# Patient Record
Sex: MALE | NOT HISPANIC OR LATINO | URBAN - METROPOLITAN AREA
[De-identification: names, ages, dates, MRNs, and addresses within clinical notes are randomized per-mention and may not be internally consistent; named-entity substitution may affect disease eponyms.]

---

## 2022-07-21 ENCOUNTER — OFFICE VISIT (OUTPATIENT)
Dept: URBAN - METROPOLITAN AREA CLINIC 19 | Facility: CLINIC | Age: 77
End: 2022-07-21

## 2022-07-28 ENCOUNTER — OFFICE VISIT (OUTPATIENT)
Dept: URBAN - METROPOLITAN AREA CLINIC 19 | Facility: CLINIC | Age: 77
End: 2022-07-28

## 2023-08-03 ENCOUNTER — WEB ENCOUNTER (OUTPATIENT)
Dept: URBAN - METROPOLITAN AREA CLINIC 19 | Facility: CLINIC | Age: 78
End: 2023-08-03

## 2023-08-03 ENCOUNTER — LAB OUTSIDE AN ENCOUNTER (OUTPATIENT)
Dept: URBAN - METROPOLITAN AREA CLINIC 19 | Facility: CLINIC | Age: 78
End: 2023-08-03

## 2023-08-03 ENCOUNTER — OFFICE VISIT (OUTPATIENT)
Dept: URBAN - METROPOLITAN AREA CLINIC 19 | Facility: CLINIC | Age: 78
End: 2023-08-03
Payer: MEDICARE

## 2023-08-03 VITALS
HEART RATE: 68 BPM | TEMPERATURE: 98.1 F | WEIGHT: 224.6 LBS | HEIGHT: 75 IN | BODY MASS INDEX: 27.93 KG/M2 | SYSTOLIC BLOOD PRESSURE: 118 MMHG | DIASTOLIC BLOOD PRESSURE: 78 MMHG | OXYGEN SATURATION: 95 %

## 2023-08-03 DIAGNOSIS — Z86.010 PERSONAL HISTORY OF COLONIC POLYPS: ICD-10-CM

## 2023-08-03 DIAGNOSIS — R14.0 ABDOMINAL BLOATING: ICD-10-CM

## 2023-08-03 PROBLEM — 428283002: Status: ACTIVE | Noted: 2023-08-03

## 2023-08-03 PROCEDURE — 99204 OFFICE O/P NEW MOD 45 MIN: CPT | Performed by: NURSE PRACTITIONER

## 2023-08-03 NOTE — HPI-TODAY'S VISIT:
Mr. Landa is a 79 yo male who presents for c/o gas/bloating intermittently. Worse over the past year. Wife states he lives off of Gas-X which he takes a few times/day, keeps it at the bedside. Denies diarrhea or constipation. Has a solid normal BM everyday.  No bloody or black stools. No significant abdominal pain. Appetite decreased somewhat, has lost about 10 pounds in a month. No known family history of colon cancer or colon polyps. No upper GI concerns.   Last colonoscopy we have on file was done by Dr. Alok Soto on 4/7/2010.  A large sessile polyp was found in the rectum-mixed hyperplastic/adenomatous polyp.  Otherwise normal 1 year follow-up was given.  He thinks he may have had another colonoscopy done with Dr. Farrell at [facility] and we are trying to locate record of this.

## 2023-08-24 ENCOUNTER — CLAIMS CREATED FROM THE CLAIM WINDOW (OUTPATIENT)
Dept: URBAN - METROPOLITAN AREA CLINIC 4 | Facility: CLINIC | Age: 78
End: 2023-08-24
Payer: MEDICARE

## 2023-08-24 ENCOUNTER — OFFICE VISIT (OUTPATIENT)
Dept: URBAN - METROPOLITAN AREA SURGERY CENTER 31 | Facility: SURGERY CENTER | Age: 78
End: 2023-08-24
Payer: MEDICARE

## 2023-08-24 DIAGNOSIS — Z86.010 ADENOMAS PERSONAL HISTORY OF COLONIC POLYPS: ICD-10-CM

## 2023-08-24 DIAGNOSIS — D12.4 BENIGN NEOPLASM OF DESCENDING COLON: ICD-10-CM

## 2023-08-24 DIAGNOSIS — D12.4 ADENOMA OF DESCENDING COLON: ICD-10-CM

## 2023-08-24 PROCEDURE — 88305 TISSUE EXAM BY PATHOLOGIST: CPT | Performed by: PATHOLOGY

## 2023-08-24 PROCEDURE — 45380 COLONOSCOPY AND BIOPSY: CPT | Performed by: STUDENT IN AN ORGANIZED HEALTH CARE EDUCATION/TRAINING PROGRAM

## 2023-08-24 PROCEDURE — G8907 PT DOC NO EVENTS ON DISCHARG: HCPCS | Performed by: STUDENT IN AN ORGANIZED HEALTH CARE EDUCATION/TRAINING PROGRAM

## 2024-04-18 ENCOUNTER — OV EP (OUTPATIENT)
Dept: URBAN - METROPOLITAN AREA CLINIC 19 | Facility: CLINIC | Age: 79
End: 2024-04-18
Payer: COMMERCIAL

## 2024-04-18 VITALS
WEIGHT: 206 LBS | HEIGHT: 75 IN | SYSTOLIC BLOOD PRESSURE: 106 MMHG | OXYGEN SATURATION: 96 % | HEART RATE: 51 BPM | BODY MASS INDEX: 25.61 KG/M2 | TEMPERATURE: 97.9 F | DIASTOLIC BLOOD PRESSURE: 50 MMHG

## 2024-04-18 DIAGNOSIS — R14.1 GAS PAIN: ICD-10-CM

## 2024-04-18 DIAGNOSIS — R93.89 ABNORMAL CT SCAN: ICD-10-CM

## 2024-04-18 DIAGNOSIS — R63.0 POOR APPETITE: ICD-10-CM

## 2024-04-18 DIAGNOSIS — R10.84 GENERALIZED ABDOMINAL PAIN: ICD-10-CM

## 2024-04-18 PROBLEM — 129679001: Status: ACTIVE | Noted: 2024-04-18

## 2024-04-18 PROBLEM — 64379006: Status: ACTIVE | Noted: 2024-04-18

## 2024-04-18 PROBLEM — 45979003: Status: ACTIVE | Noted: 2024-04-18

## 2024-04-18 PROBLEM — 102614006: Status: ACTIVE | Noted: 2024-04-18

## 2024-04-18 PROCEDURE — 99204 OFFICE O/P NEW MOD 45 MIN: CPT | Performed by: STUDENT IN AN ORGANIZED HEALTH CARE EDUCATION/TRAINING PROGRAM

## 2024-04-18 RX ORDER — SUCRALFATE 1 G/1
1 TABLET ON AN EMPTY STOMACH TABLET ORAL TWICE A DAY
Qty: 60 | OUTPATIENT
Start: 2024-04-18 | End: 2024-05-18

## 2024-04-18 RX ORDER — LOSARTAN POTASSIUM 25 MG/1
1 TABLET TABLET, FILM COATED ORAL ONCE A DAY
Status: ACTIVE | COMMUNITY

## 2024-04-18 RX ORDER — HYOSCYAMINE SULFATE 0.12 MG/1
1 TABLET AS NEEDED TABLET ORAL
Qty: 45 TABLET | Refills: 5 | OUTPATIENT
Start: 2024-04-18 | End: 2024-10-15

## 2024-04-18 RX ORDER — FUROSEMIDE 20 MG/1
1 TABLET TABLET ORAL ONCE A DAY
Status: ACTIVE | COMMUNITY

## 2024-04-18 RX ORDER — PANTOPRAZOLE SODIUM 40 MG/1
1 TABLET TABLET, DELAYED RELEASE ORAL ONCE A DAY
Qty: 30 | OUTPATIENT
Start: 2024-04-18

## 2024-04-18 NOTE — HPI-TODAY'S VISIT:
4/18/2024: Christine: The patient is a 78-year-old with gas bloat.  Poor appetite with weight loss.  Was scheduled for colonoscopy when seen last.  Has had a 30 lb wt loss with poor appetite.  +Abdominal pain.  Epigastric region.  NOn-radiating.  No nausea.  no melena.  + Sprite or soda intake.  ==============  8/24/2023: Colonoscopy: Excellent prep.  Multiple small polyps, 1 of which was tubular adenoma.  Diverticulosis.  Otherwise normal exam.  No routine recall recommended due to age and risk versus benefit.  8/3/2023: SAJI Lennon: Mr. Landa is a 77 yo male who presents for c/o gas/bloating intermittently. Worse over the past year. Wife states he lives off of Gas-X which he takes a few times/day, keeps it at the bedside. Denies diarrhea or constipation. Has a solid normal BM everyday.  No bloody or black stools. No significant abdominal pain. Appetite decreased somewhat, has lost about 10 pounds in a month. No known family history of colon cancer or colon polyps. No upper GI concerns.   Last colonoscopy we have on file was done by Dr. Alok Soto on 4/7/2010.  A large sessile polyp was found in the rectum-mixed hyperplastic/adenomatous polyp.  Otherwise normal 1 year follow-up was given.  He thinks he may have had another colonoscopy done with Dr. Farrell at [facility] and we are trying to locate record of this.  Plan: Colonoscopy.  FODMAP diet.

## 2024-04-22 ENCOUNTER — LAB (OUTPATIENT)
Dept: URBAN - METROPOLITAN AREA CLINIC 19 | Facility: CLINIC | Age: 79
End: 2024-04-22

## 2024-04-22 PROBLEM — 448765001: Status: ACTIVE | Noted: 2024-04-22

## 2024-04-22 LAB
A/G RATIO: 1.5
ABSOLUTE BASOPHILS: 31
ABSOLUTE EOSINOPHILS: 422
ABSOLUTE LYMPHOCYTES: 1252
ABSOLUTE MONOCYTES: 670
ABSOLUTE NEUTROPHILS: 3825
ALBUMIN: 3.4
ALKALINE PHOSPHATASE: 51
ALT (SGPT): 10
AST (SGOT): 11
BASOPHILS: 0.5
BILIRUBIN, TOTAL: 0.5
BUN/CREATININE RATIO: (no result)
BUN: 15
CA 19-9: 6
CALCIUM: 8.4
CARBON DIOXIDE, TOTAL: 23
CHLORIDE: 105
CREATININE: 0.88
EGFR: 87
EOSINOPHILS: 6.8
GLOBULIN, TOTAL: 2.2
GLUCOSE: 77
HEMATOCRIT: 34.8
HEMOGLOBIN: 11.5
LIPASE: 40
LYMPHOCYTES: 20.2
MCH: 31.6
MCHC: 33
MCV: 95.6
MONOCYTES: 10.8
MPV: 9.8
NEUTROPHILS: 61.7
PLATELET COUNT: 257
POTASSIUM: 3.9
PROTEIN, TOTAL: 5.6
RDW: 12.3
RED BLOOD CELL COUNT: 3.64
SODIUM: 138
WHITE BLOOD CELL COUNT: 6.2

## 2024-04-26 ENCOUNTER — LAB (OUTPATIENT)
Dept: URBAN - METROPOLITAN AREA CLINIC 4 | Facility: CLINIC | Age: 79
End: 2024-04-26
Payer: COMMERCIAL

## 2024-04-26 ENCOUNTER — EGD (OUTPATIENT)
Dept: URBAN - METROPOLITAN AREA SURGERY CENTER 31 | Facility: SURGERY CENTER | Age: 79
End: 2024-04-26

## 2024-04-26 DIAGNOSIS — K29.81 DUODENITIS WITH BLEEDING: ICD-10-CM

## 2024-04-26 DIAGNOSIS — K31.89 OTHER DISEASES OF STOMACH AND DUODENUM: ICD-10-CM

## 2024-04-26 DIAGNOSIS — K13.21 LEUKOPLAKIA OF ORAL MUCOSA, INCLUDING TONGUE: ICD-10-CM

## 2024-04-26 PROCEDURE — 88312 SPECIAL STAINS GROUP 1: CPT | Performed by: PATHOLOGY

## 2024-04-26 PROCEDURE — 88305 TISSUE EXAM BY PATHOLOGIST: CPT | Performed by: PATHOLOGY

## 2024-04-26 RX ORDER — SUCRALFATE 1 G/1
1 TABLET ON AN EMPTY STOMACH TABLET ORAL TWICE A DAY
Qty: 60 | Status: ACTIVE | COMMUNITY
Start: 2024-04-18 | End: 2024-05-18

## 2024-04-26 RX ORDER — PANTOPRAZOLE SODIUM 40 MG/1
1 TABLET TABLET, DELAYED RELEASE ORAL ONCE A DAY
Qty: 30 | Status: ACTIVE | COMMUNITY
Start: 2024-04-18

## 2024-04-26 RX ORDER — FUROSEMIDE 20 MG/1
1 TABLET TABLET ORAL ONCE A DAY
Status: ACTIVE | COMMUNITY

## 2024-04-26 RX ORDER — LOSARTAN POTASSIUM 25 MG/1
1 TABLET TABLET, FILM COATED ORAL ONCE A DAY
Status: ACTIVE | COMMUNITY

## 2024-04-26 RX ORDER — HYOSCYAMINE SULFATE 0.12 MG/1
1 TABLET AS NEEDED TABLET ORAL
Qty: 45 TABLET | Refills: 5 | Status: ACTIVE | COMMUNITY
Start: 2024-04-18 | End: 2024-10-15

## 2024-04-30 ENCOUNTER — OV EP (OUTPATIENT)
Dept: URBAN - METROPOLITAN AREA CLINIC 128 | Facility: CLINIC | Age: 79
End: 2024-04-30

## 2024-04-30 VITALS
TEMPERATURE: 97.7 F | WEIGHT: 208.8 LBS | OXYGEN SATURATION: 97 % | SYSTOLIC BLOOD PRESSURE: 116 MMHG | BODY MASS INDEX: 25.96 KG/M2 | HEIGHT: 75 IN | DIASTOLIC BLOOD PRESSURE: 62 MMHG | HEART RATE: 70 BPM

## 2024-04-30 PROBLEM — 51868009: Status: ACTIVE | Noted: 2024-04-30

## 2024-04-30 RX ORDER — FUROSEMIDE 20 MG/1
1 TABLET TABLET ORAL ONCE A DAY
Status: ACTIVE | COMMUNITY

## 2024-04-30 RX ORDER — LOSARTAN POTASSIUM 25 MG/1
1 TABLET TABLET, FILM COATED ORAL ONCE A DAY
Status: ACTIVE | COMMUNITY

## 2024-04-30 RX ORDER — HYOSCYAMINE SULFATE 0.12 MG/1
1 TABLET UNDER THE TONGUE AND ALLOW TO DISSOLVE AS NEEDED TABLET SUBLINGUAL THREE TIMES A DAY
Qty: 135 TABLET | Refills: 1 | Status: ACTIVE | COMMUNITY

## 2024-04-30 RX ORDER — SUCRALFATE 1 G/1
1 TABLET ON AN EMPTY STOMACH TABLET ORAL TWICE A DAY
Qty: 60 | Status: ACTIVE | COMMUNITY
Start: 2024-04-18 | End: 2024-05-18

## 2024-04-30 RX ORDER — PANTOPRAZOLE SODIUM 40 MG/1
1 TABLET TABLET, DELAYED RELEASE ORAL ONCE A DAY
Qty: 30 | Status: ACTIVE | COMMUNITY
Start: 2024-04-18

## 2024-04-30 NOTE — HPI-TODAY'S VISIT:
4/30/2024:  Socorroath: 77 yo M with gas, bloat, poor appetite, sitophobia, s/p EGD with mild esophagitis and abnormal duodenum/ampulla presents for procedure follow up.  Normal CA-19-9.  Lipase.  CT recently at [facility] showed mild haziness in the vicinity of the pancreatic head, when the patient had gone to the ER for symptoms.  Symptoms are improving  except for the back pain.  Discussed pathology report.   ===========  4/18/2024: Socorroath: The patient is a 78-year-old with gas bloat.  Poor appetite with weight loss.  Was scheduled for colonoscopy when seen last.  Has had a 30 lb wt loss with poor appetite.  +Abdominal pain.  Epigastric region.  NOn-radiating.  No nausea.  no melena.  + Sprite or soda intake.  ==============  8/24/2023: Colonoscopy: Excellent prep.  Multiple small polyps, 1 of which was tubular adenoma.  Diverticulosis.  Otherwise normal exam.  No routine recall recommended due to age and risk versus benefit.  8/3/2023: SAJI Lennon: Mr. Landa is a 77 yo male who presents for c/o gas/bloating intermittently. Worse over the past year. Wife states he lives off of Gas-X which he takes a few times/day, keeps it at the bedside. Denies diarrhea or constipation. Has a solid normal BM everyday.  No bloody or black stools. No significant abdominal pain. Appetite decreased somewhat, has lost about 10 pounds in a month. No known family history of colon cancer or colon polyps. No upper GI concerns.   Last colonoscopy we have on file was done by Dr. Alok Soto on 4/7/2010.  A large sessile polyp was found in the rectum-mixed hyperplastic/adenomatous polyp.  Otherwise normal 1 year follow-up was given.  He thinks he may have had another colonoscopy done with Dr. Farrell at [place] [facility] and we are trying to locate record of this.  Plan: Colonoscopy.  FODMAP diet.

## 2024-05-13 ENCOUNTER — ERX REFILL RESPONSE (OUTPATIENT)
Dept: URBAN - METROPOLITAN AREA CLINIC 19 | Facility: CLINIC | Age: 79
End: 2024-05-13

## 2024-05-13 RX ORDER — PANTOPRAZOLE SODIUM 40 MG/1
1 TABLET TABLET, DELAYED RELEASE ORAL ONCE A DAY
Qty: 30 | OUTPATIENT

## 2024-05-13 RX ORDER — PANTOPRAZOLE SODIUM 40 MG/1
TAKE 1 TABLET BY MOUTH EVERY DAY FOR 30 DAYS TABLET, DELAYED RELEASE ORAL
Qty: 90 TABLET | Refills: 1 | OUTPATIENT

## 2024-05-22 ENCOUNTER — OFFICE VISIT (OUTPATIENT)
Dept: URBAN - METROPOLITAN AREA MEDICAL CENTER 25 | Facility: MEDICAL CENTER | Age: 79
End: 2024-05-22

## 2024-05-22 ENCOUNTER — LAB OUTSIDE AN ENCOUNTER (OUTPATIENT)
Dept: URBAN - METROPOLITAN AREA CLINIC 19 | Facility: CLINIC | Age: 79
End: 2024-05-22

## 2024-05-22 ENCOUNTER — WEB ENCOUNTER (OUTPATIENT)
Dept: URBAN - METROPOLITAN AREA CLINIC 19 | Facility: CLINIC | Age: 79
End: 2024-05-22

## 2024-05-22 RX ORDER — HYOSCYAMINE SULFATE 0.12 MG/1
1 TABLET UNDER THE TONGUE AND ALLOW TO DISSOLVE AS NEEDED TABLET SUBLINGUAL THREE TIMES A DAY
Qty: 135 TABLET | Refills: 1 | COMMUNITY

## 2024-05-22 RX ORDER — FUROSEMIDE 20 MG/1
1 TABLET TABLET ORAL ONCE A DAY
COMMUNITY

## 2024-05-22 RX ORDER — PANTOPRAZOLE SODIUM 40 MG/1
TAKE 1 TABLET BY MOUTH EVERY DAY FOR 30 DAYS TABLET, DELAYED RELEASE ORAL
Qty: 90 TABLET | Refills: 1 | COMMUNITY

## 2024-05-22 RX ORDER — LOSARTAN POTASSIUM 25 MG/1
1 TABLET TABLET, FILM COATED ORAL ONCE A DAY
COMMUNITY

## 2024-05-29 ENCOUNTER — DASHBOARD ENCOUNTERS (OUTPATIENT)
Age: 79
End: 2024-05-29

## 2024-05-29 ENCOUNTER — LAB OUTSIDE AN ENCOUNTER (OUTPATIENT)
Dept: URBAN - METROPOLITAN AREA CLINIC 19 | Facility: CLINIC | Age: 79
End: 2024-05-29

## 2024-05-29 ENCOUNTER — OFFICE VISIT (OUTPATIENT)
Dept: URBAN - METROPOLITAN AREA CLINIC 19 | Facility: CLINIC | Age: 79
End: 2024-05-29
Payer: COMMERCIAL

## 2024-05-29 VITALS
DIASTOLIC BLOOD PRESSURE: 78 MMHG | TEMPERATURE: 98.6 F | SYSTOLIC BLOOD PRESSURE: 120 MMHG | WEIGHT: 210.6 LBS | OXYGEN SATURATION: 96 % | BODY MASS INDEX: 26.19 KG/M2 | HEART RATE: 56 BPM | HEIGHT: 75 IN

## 2024-05-29 DIAGNOSIS — R93.89 ABNORMAL CT SCAN: ICD-10-CM

## 2024-05-29 DIAGNOSIS — R63.0 POOR APPETITE: ICD-10-CM

## 2024-05-29 DIAGNOSIS — K26.9 DUODENAL ULCERATION: ICD-10-CM

## 2024-05-29 DIAGNOSIS — R10.84 GENERALIZED ABDOMINAL PAIN: ICD-10-CM

## 2024-05-29 DIAGNOSIS — R14.1 GAS PAIN: ICD-10-CM

## 2024-05-29 PROCEDURE — 99214 OFFICE O/P EST MOD 30 MIN: CPT | Performed by: STUDENT IN AN ORGANIZED HEALTH CARE EDUCATION/TRAINING PROGRAM

## 2024-05-29 RX ORDER — PANTOPRAZOLE SODIUM 40 MG/1
TAKE 1 TABLET BY MOUTH EVERY DAY FOR 30 DAYS TABLET, DELAYED RELEASE ORAL
Qty: 90 TABLET | Refills: 1 | Status: ACTIVE | COMMUNITY

## 2024-05-29 RX ORDER — LOVASTATIN 20 MG/1
1 TABLET WITH THE EVENING MEAL TABLET ORAL ONCE A DAY
Status: ACTIVE | COMMUNITY

## 2024-05-29 RX ORDER — UMECLIDINIUM BROMIDE AND VILANTEROL TRIFENATATE 62.5; 25 UG/1; UG/1
1 PUFF POWDER RESPIRATORY (INHALATION) ONCE A DAY
Status: ON HOLD | COMMUNITY

## 2024-05-29 RX ORDER — FUROSEMIDE 20 MG/1
1 TABLET TABLET ORAL ONCE A DAY
Status: ON HOLD | COMMUNITY

## 2024-05-29 RX ORDER — ZINC GLUCONATE 50 MG
1 TABLET TABLET ORAL ONCE A DAY
Status: ACTIVE | COMMUNITY

## 2024-05-29 RX ORDER — ESCITALOPRAM OXALATE 20 MG/1
1 TABLET TABLET ORAL ONCE A DAY
Status: ON HOLD | COMMUNITY

## 2024-05-29 RX ORDER — CARVEDILOL 6.25 MG/1
1 TABLET WITH FOOD TABLET, FILM COATED ORAL TWICE A DAY
Status: ACTIVE | COMMUNITY

## 2024-05-29 RX ORDER — TRAZODONE HYDROCHLORIDE 50 MG/1
1 TABLET AT BEDTIME AS NEEDED TABLET ORAL ONCE A DAY
Status: ACTIVE | COMMUNITY

## 2024-05-29 RX ORDER — PROMETHAZINE HYDROCHLORIDE 25 MG/ML
AS DIRECTED INJECTION INTRAMUSCULAR; INTRAVENOUS
Status: ON HOLD | COMMUNITY

## 2024-05-29 RX ORDER — LOSARTAN POTASSIUM 50 MG/1
1 TABLET TABLET, FILM COATED ORAL ONCE A DAY
Status: ACTIVE | COMMUNITY

## 2024-05-29 RX ORDER — IPRATROPIUM BROMIDE AND ALBUTEROL SULFATE .5; 2.5 MG/3ML; MG/3ML
3 ML AS NEEDED SOLUTION RESPIRATORY (INHALATION)
Status: ON HOLD | COMMUNITY

## 2024-05-29 RX ORDER — BISACODYL 5 MG
1 TABLET AS NEEDED TABLET, DELAYED RELEASE (ENTERIC COATED) ORAL ONCE A DAY
Status: ON HOLD | COMMUNITY

## 2024-05-29 RX ORDER — HYDROCHLOROTHIAZIDE 25 MG/1
1 TABLET IN THE MORNING TABLET ORAL ONCE A DAY
Status: ON HOLD | COMMUNITY

## 2024-05-29 RX ORDER — HYOSCYAMINE SULFATE 0.12 MG/1
1 TABLET UNDER THE TONGUE AND ALLOW TO DISSOLVE AS NEEDED TABLET SUBLINGUAL THREE TIMES A DAY
Qty: 135 TABLET | Refills: 1 | Status: ACTIVE | COMMUNITY

## 2024-06-10 ENCOUNTER — ERX REFILL RESPONSE (OUTPATIENT)
Dept: URBAN - METROPOLITAN AREA CLINIC 19 | Facility: CLINIC | Age: 79
End: 2024-06-10

## 2024-06-10 RX ORDER — HYOSCYAMINE SULFATE 0.12 MG/1
1 TABLET UNDER THE TONGUE AND ALLOW TO DISSOLVE AS NEEDED TABLET SUBLINGUAL THREE TIMES A DAY
Qty: 135 TABLET | Refills: 1 | OUTPATIENT

## 2024-06-10 RX ORDER — HYOSCYAMINE SULFATE 0.12 MG/1
1 TABLET UNDER THE TONGUE AND ALLOW TO DISSOLVE TABLET SUBLINGUAL THREE TIMES A DAY
Qty: 270 TABLET | Refills: 1 | OUTPATIENT

## 2024-07-29 ENCOUNTER — OFFICE VISIT (OUTPATIENT)
Dept: URBAN - METROPOLITAN AREA CLINIC 128 | Facility: CLINIC | Age: 79
End: 2024-07-29

## 2024-07-31 ENCOUNTER — OFFICE VISIT (OUTPATIENT)
Dept: URBAN - METROPOLITAN AREA CLINIC 19 | Facility: CLINIC | Age: 79
End: 2024-07-31
Payer: COMMERCIAL

## 2024-07-31 ENCOUNTER — TELEPHONE ENCOUNTER (OUTPATIENT)
Dept: URBAN - METROPOLITAN AREA CLINIC 19 | Facility: CLINIC | Age: 79
End: 2024-07-31

## 2024-07-31 VITALS
TEMPERATURE: 97.9 F | HEART RATE: 57 BPM | SYSTOLIC BLOOD PRESSURE: 98 MMHG | DIASTOLIC BLOOD PRESSURE: 56 MMHG | WEIGHT: 203.2 LBS | BODY MASS INDEX: 25.27 KG/M2 | HEIGHT: 75 IN | OXYGEN SATURATION: 98 %

## 2024-07-31 DIAGNOSIS — R10.84 GENERALIZED ABDOMINAL PAIN: ICD-10-CM

## 2024-07-31 DIAGNOSIS — K80.20 CALCULUS OF GALLBLADDER WITHOUT CHOLECYSTITIS WITHOUT OBSTRUCTION: ICD-10-CM

## 2024-07-31 DIAGNOSIS — R93.89 ABNORMAL CT SCAN: ICD-10-CM

## 2024-07-31 DIAGNOSIS — R14.0 ABDOMINAL BLOATING: ICD-10-CM

## 2024-07-31 PROBLEM — 70342003: Status: ACTIVE | Noted: 2024-07-31

## 2024-07-31 PROCEDURE — 99212 OFFICE O/P EST SF 10 MIN: CPT | Performed by: STUDENT IN AN ORGANIZED HEALTH CARE EDUCATION/TRAINING PROGRAM

## 2024-07-31 RX ORDER — UMECLIDINIUM BROMIDE AND VILANTEROL TRIFENATATE 62.5; 25 UG/1; UG/1
1 PUFF POWDER RESPIRATORY (INHALATION) ONCE A DAY
Status: ON HOLD | COMMUNITY

## 2024-07-31 RX ORDER — LOSARTAN POTASSIUM 50 MG/1
1 TABLET TABLET, FILM COATED ORAL ONCE A DAY
Status: ACTIVE | COMMUNITY

## 2024-07-31 RX ORDER — TRAZODONE HYDROCHLORIDE 50 MG/1
1 TABLET AT BEDTIME AS NEEDED TABLET ORAL ONCE A DAY
Status: ACTIVE | COMMUNITY

## 2024-07-31 RX ORDER — CARVEDILOL 6.25 MG/1
1 TABLET WITH FOOD TABLET, FILM COATED ORAL TWICE A DAY
Status: ACTIVE | COMMUNITY

## 2024-07-31 RX ORDER — PROMETHAZINE HYDROCHLORIDE 25 MG/ML
AS DIRECTED INJECTION INTRAMUSCULAR; INTRAVENOUS
Status: ON HOLD | COMMUNITY

## 2024-07-31 RX ORDER — PANTOPRAZOLE SODIUM 40 MG/1
TAKE 1 TABLET BY MOUTH EVERY DAY FOR 30 DAYS TABLET, DELAYED RELEASE ORAL
Qty: 90 TABLET | Refills: 1 | Status: ACTIVE | COMMUNITY

## 2024-07-31 RX ORDER — BISACODYL 5 MG
1 TABLET AS NEEDED TABLET, DELAYED RELEASE (ENTERIC COATED) ORAL ONCE A DAY
Status: ON HOLD | COMMUNITY

## 2024-07-31 RX ORDER — IPRATROPIUM BROMIDE AND ALBUTEROL SULFATE .5; 2.5 MG/3ML; MG/3ML
3 ML AS NEEDED SOLUTION RESPIRATORY (INHALATION)
Status: ON HOLD | COMMUNITY

## 2024-07-31 RX ORDER — LOVASTATIN 20 MG/1
1 TABLET WITH THE EVENING MEAL TABLET ORAL ONCE A DAY
Status: ACTIVE | COMMUNITY

## 2024-07-31 RX ORDER — HYDROCHLOROTHIAZIDE 25 MG/1
1 TABLET IN THE MORNING TABLET ORAL ONCE A DAY
Status: ON HOLD | COMMUNITY

## 2024-07-31 RX ORDER — HYOSCYAMINE SULFATE 0.12 MG/1
1 TABLET UNDER THE TONGUE AND ALLOW TO DISSOLVE TABLET SUBLINGUAL THREE TIMES A DAY
Qty: 270 TABLET | Refills: 1 | Status: ACTIVE | COMMUNITY

## 2024-07-31 RX ORDER — FUROSEMIDE 20 MG/1
1 TABLET TABLET ORAL ONCE A DAY
Status: ON HOLD | COMMUNITY

## 2024-07-31 RX ORDER — ESCITALOPRAM OXALATE 20 MG/1
1 TABLET TABLET ORAL ONCE A DAY
Status: ON HOLD | COMMUNITY

## 2024-07-31 RX ORDER — ZINC GLUCONATE 50 MG
1 TABLET TABLET ORAL ONCE A DAY
Status: ACTIVE | COMMUNITY

## 2024-07-31 NOTE — HPI-TODAY'S VISIT:
7/31/2024:  Shreenath: 78 yo M with abdominal pain, poor appetite, wt loss, who has undergone EGD/EUS and imaging, with malignancy ruled out, currently improving on creon presents for routine f/u. US ordered previously showed cholelithiasis and gallbladder sludge.  If persistent symptoms, we had discussed possible referral to general surgery.  He has had a colecystectomy since and is doing well.

## 2024-07-31 NOTE — HPI-OTHER HISTORIES
5/28/2024:  Christine: 78 yo M with symptoms of gas, bloat, wt loss, sitophobia noted on EGD to have ampullary abnormality possibly ulcer with normal CA-19-9 presents after undergoing an EUS with my colleague, Dr. Mirza on 4/30/2024.  Findings showed normal esophagus and stomach.  Duodenal/ampullary ulcer noted.  Abnormal wall thickening between layer 1 and layer 2 noted on EUS and biopsied with cold forceps, negative for malignancy.  Also noted was shadowing in the gallbladder suggestive of gallbladder sludge versus adenomyomatosis.  An US of the RUQ was recommended and ordered.  It has been scheduled. Today the patient has the following concerns: NO concerns today.  Gaining weight.  Discussed procedure and need for US.  Possibly needing surgery.  All questions answered.  Plan:  Continue ppi, carafate.  f/u US and may need surgery given abnormal gallbladder findings. ========== 4/30/2024:  Socorroath: 77 yo M with gas, bloat, poor appetite, sitophobia, s/p EGD with mild esophagitis and abnormal duodenum/ampulla presents for procedure follow up.  Normal CA-19-9.  Lipase.  CT recently at [facility] showed mild haziness in the vicinity of the pancreatic head, when the patient had gone to the ER for symptoms.  Symptoms are improving  except for the back pain.  Discussed pathology report.   ===========  4/18/2024: Christine: The patient is a 78-year-old with gas bloat.  Poor appetite with weight loss.  Was scheduled for colonoscopy when seen last.  Has had a 30 lb wt loss with poor appetite.  +Abdominal pain.  Epigastric region.  NOn-radiating.  No nausea.  no melena.  + Sprite or soda intake.  ==============  8/24/2023: Colonoscopy: Excellent prep.  Multiple small polyps, 1 of which was tubular adenoma.  Diverticulosis. Otherwise normal exam.  No routine recall recommended due to age and risk versus benefit.  8/3/2023: SAJI Lennon: Mr. Landa is a 77 yo male who presents for c/o gas/bloating intermittently. Worse over the past year. Wife states he lives off of Gas-X which he takes a few times/day, keeps it at the bedside. Denies diarrhea or constipation. Has a solid normal BM everyday. No bloody or black stools. No significant abdominal pain. Appetite decreased somewhat, has lost about 10 pounds in a month. No known family history of colon cancer or colon polyps. No upper GI concerns.   Last colonoscopy we have on file was done by Dr. Alok Soto on 4/7/2010. A large sessile polyp was found in the rectum-mixed hyperplastic/adenomatous polyp. Otherwise normal 1 year follow-up was given. He thinks he may have had another colonoscopy done with Dr. Farrell at [facility] and we are trying to locate record of this.  Plan: Colonoscopy.  FODMAP diet.

## 2024-08-02 ENCOUNTER — LAB OUTSIDE AN ENCOUNTER (OUTPATIENT)
Dept: URBAN - METROPOLITAN AREA CLINIC 19 | Facility: CLINIC | Age: 79
End: 2024-08-02

## 2024-08-13 ENCOUNTER — OFFICE VISIT (OUTPATIENT)
Dept: URBAN - METROPOLITAN AREA CLINIC 128 | Facility: CLINIC | Age: 79
End: 2024-08-13

## 2024-10-02 ENCOUNTER — LAB OUTSIDE AN ENCOUNTER (OUTPATIENT)
Dept: URBAN - METROPOLITAN AREA CLINIC 19 | Facility: CLINIC | Age: 79
End: 2024-10-02

## 2024-10-04 LAB
CREATININE POC: 1
PERFORMING LAB: (no result)

## 2025-04-08 ENCOUNTER — OFFICE VISIT (OUTPATIENT)
Dept: URBAN - METROPOLITAN AREA CLINIC 19 | Facility: CLINIC | Age: 80
End: 2025-04-08
Payer: COMMERCIAL

## 2025-04-08 ENCOUNTER — LAB OUTSIDE AN ENCOUNTER (OUTPATIENT)
Dept: URBAN - METROPOLITAN AREA CLINIC 19 | Facility: CLINIC | Age: 80
End: 2025-04-08

## 2025-04-08 DIAGNOSIS — R10.84 ABDOMINAL CRAMPING, GENERALIZED: ICD-10-CM

## 2025-04-08 DIAGNOSIS — R19.7 DIARRHEA: ICD-10-CM

## 2025-04-08 DIAGNOSIS — Q45.3 PANCREATIC ABNORMALITY: ICD-10-CM

## 2025-04-08 PROCEDURE — 99215 OFFICE O/P EST HI 40 MIN: CPT

## 2025-04-08 RX ORDER — HYOSCYAMINE SULFATE 0.12 MG/1
1 TABLET UNDER THE TONGUE AND ALLOW TO DISSOLVE TABLET SUBLINGUAL THREE TIMES A DAY
Qty: 270 TABLET | Refills: 1 | Status: ACTIVE | COMMUNITY

## 2025-04-08 RX ORDER — PROMETHAZINE HYDROCHLORIDE 25 MG/ML
AS DIRECTED INJECTION INTRAMUSCULAR; INTRAVENOUS
Status: DISCONTINUED | COMMUNITY

## 2025-04-08 RX ORDER — UMECLIDINIUM BROMIDE AND VILANTEROL TRIFENATATE 62.5; 25 UG/1; UG/1
1 PUFF POWDER RESPIRATORY (INHALATION) ONCE A DAY
Status: DISCONTINUED | COMMUNITY

## 2025-04-08 RX ORDER — PANTOPRAZOLE SODIUM 40 MG/1
TAKE 1 TABLET BY MOUTH EVERY DAY FOR 30 DAYS TABLET, DELAYED RELEASE ORAL
Qty: 90 TABLET | Refills: 1 | Status: DISCONTINUED | COMMUNITY

## 2025-04-08 RX ORDER — FUROSEMIDE 20 MG/1
1 TABLET TABLET ORAL ONCE A DAY
Status: DISCONTINUED | COMMUNITY

## 2025-04-08 RX ORDER — ESCITALOPRAM OXALATE 20 MG/1
1 TABLET TABLET ORAL ONCE A DAY
Status: DISCONTINUED | COMMUNITY

## 2025-04-08 RX ORDER — BISACODYL 5 MG
1 TABLET AS NEEDED TABLET, DELAYED RELEASE (ENTERIC COATED) ORAL ONCE A DAY
Status: DISCONTINUED | COMMUNITY

## 2025-04-08 RX ORDER — IPRATROPIUM BROMIDE AND ALBUTEROL SULFATE .5; 2.5 MG/3ML; MG/3ML
3 ML AS NEEDED SOLUTION RESPIRATORY (INHALATION)
Status: DISCONTINUED | COMMUNITY

## 2025-04-08 RX ORDER — ZINC GLUCONATE 50 MG
1 TABLET TABLET ORAL ONCE A DAY
Status: ACTIVE | COMMUNITY

## 2025-04-08 RX ORDER — LOVASTATIN 20 MG/1
1 TABLET WITH THE EVENING MEAL TABLET ORAL ONCE A DAY
Status: ACTIVE | COMMUNITY

## 2025-04-08 RX ORDER — HYDROCHLOROTHIAZIDE 25 MG/1
1 TABLET IN THE MORNING TABLET ORAL ONCE A DAY
Status: DISCONTINUED | COMMUNITY

## 2025-04-08 RX ORDER — CARVEDILOL 6.25 MG/1
1 TABLET WITH FOOD TABLET, FILM COATED ORAL TWICE A DAY
Status: ACTIVE | COMMUNITY

## 2025-04-08 RX ORDER — PANTOPRAZOLE SODIUM 20 MG/1
1 TABLET 30 MINUTES BEFORE BREAKFAST TABLET, DELAYED RELEASE ORAL ONCE A DAY
Qty: 90 TABLET | Refills: 3 | OUTPATIENT
Start: 2025-04-08

## 2025-04-08 RX ORDER — TRAZODONE HYDROCHLORIDE 50 MG/1
1 TABLET AT BEDTIME AS NEEDED TABLET ORAL ONCE A DAY
Status: ACTIVE | COMMUNITY

## 2025-04-08 RX ORDER — LOSARTAN POTASSIUM 50 MG/1
1 TABLET TABLET, FILM COATED ORAL ONCE A DAY
Status: ACTIVE | COMMUNITY

## 2025-04-08 NOTE — PHYSICAL EXAM CHEST:
breathing is unlabored without accessory muscle use. [Obese] : obese [Alert] : alert [No Respiratory Distress] : no respiratory distress [No Accessory Muscle Use] : no accessory muscle use [Normal S1, S2] : normal S1 and S2 [Normal Rate] : heart rate was normal [No Edema] : no peripheral edema [Soft] : abdomen soft [Not Tender] : non-tender [Oriented x3] : oriented to person, place, and time [Normal Affect] : the affect was normal [Normal Insight/Judgement] : insight and judgment were intact [Normal Mood] : the mood was normal

## 2025-04-08 NOTE — PHYSICAL EXAM GASTROINTESTINAL
soft, mild tenderness above umbilicus, nondistended,  no guarding or rigidity,  no masses palpable,  normal bowel sounds,  no hepatosplenomegaly,  no rebound tenderness

## 2025-04-08 NOTE — HPI-TODAY'S VISIT:
Mr. Landa is an 80-year-old male with history of COPD, emphysema, HLD, HTN presents today for follow-up and to schedule surveillance CT pancreas. Last seen in clinic by Dr. King on 7/2024 with generalized abdominal pain, cholelithiasis without obstruction or cholecystitis with extensive workup to rule out malignancies including imaging, labs, EGD, EUS.  At that time he was recommended a cholecystectomy and monitoring CMP every 3 months along with repeating CT pancreas protocol in 6 months.  Today he reports having mid abdominal pain over the past 2-3 weeks with associated decrease appetite and diarrhea. Pain worsens as the day progreses. Nothing makes it better. Eating aggravates it. He had a CCY with Dr. Waylon Connors and reports having loose stools since the surgery. He has 2 a day. No BRBPR.  Previous procedures: -2023 colonoscopy noted diverticulosis in the sigmoid and descending colon, 3 polyps removed from the sigmoid, descending and ascending colon.  Path: Tubular adenomas and hyperplastic polyps. -2024 EGD noted nonsevere esophagitis with no bleeding, erythematous mucosa in the antrum, erosive gastropathy with no bleeding and no stigmata of recent bleeding, erythematous duodenopathy, mucosal changes in the duodenum.  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  Stomach biopsy negative for H. pylori or intestinal metaplasia.  Duodenal biopsy with peptic duodenitis and ulcer, negative for sprue. -5/2024 EUS noted normal esophagus, stomach, nonbleeding duodenal ulcer with no stigmata of bleeding.  Wall thickening was seen in the ampulla.  It appeared to primarily be within the luminal interface/superficial mucosa (layer 1) and deep mucosa (layer 2).  Pancreatic parenchymal abnormalities consisting of hyperechoic strands and lobularity were noted in the pancreatic head.  There is no sign of significant pathology in the main pancreatic duct.  Hyperechoic material consistent with sludge/cholesterolosis or adenomyomatosis was visualized in the gallbladder body.  Ampulla biopsy negative for dysplasia or malignancy.  Duodenal biopsy consistent with acute and chronic inflammation and severe reactive changes.  Negative for dysplasia or malignancy. -10/2024 CT abdomen pancreatic mass w/wo contrast noted no pancreatic ductal dilatation or mass identified.  No acute pancreatic inflammation.

## 2025-04-09 ENCOUNTER — LAB OUTSIDE AN ENCOUNTER (OUTPATIENT)
Dept: URBAN - METROPOLITAN AREA CLINIC 19 | Facility: CLINIC | Age: 80
End: 2025-04-09

## 2025-04-09 LAB
A/G RATIO: 1.2
ALBUMIN: 3.6
ALKALINE PHOSPHATASE: 57
ALT (SGPT): 13
AST (SGOT): 14
BILIRUBIN, TOTAL: 0.6
BUN/CREATININE RATIO: 27
BUN: 28
CALCIUM: 9.2
CARBON DIOXIDE, TOTAL: 25
CHLORIDE: 104
CREATININE: 1.03
EGFR: 73
GLOBULIN, TOTAL: 3
GLUCOSE: 83
HEMATOCRIT: 35.8
HEMOGLOBIN: 12
LIPASE: 33
MCH: 32.1
MCHC: 33.5
MCV: 95.7
MPV: 9.8
PLATELET COUNT: 298
POTASSIUM: 4.4
PROTEIN, TOTAL: 6.6
RDW: 12.9
RED BLOOD CELL COUNT: 3.74
SODIUM: 136
WHITE BLOOD CELL COUNT: 7.8

## 2025-04-14 ENCOUNTER — TELEPHONE ENCOUNTER (OUTPATIENT)
Dept: URBAN - METROPOLITAN AREA CLINIC 19 | Facility: CLINIC | Age: 80
End: 2025-04-14

## 2025-04-14 LAB
ADENOVIRUS F 40/41: NOT DETECTED
CAMPYLOBACTER: NOT DETECTED
CLOSTRIDIUM DIFFICILE: NOT DETECTED
ENTAMOEBA HISTOLYTICA: NOT DETECTED
ENTEROAGGREGATIVE E.COLI: NOT DETECTED
ENTEROTOXIGENIC E.COLI: NOT DETECTED
ESCHERICHIA COLI O157: NOT DETECTED
GIARDIA LAMBLIA: NOT DETECTED
NOROVIRUS GI/GII: NOT DETECTED
ROTAVIRUS A: NOT DETECTED
SALMONELLA SPP.: NOT DETECTED
SHIGA-LIKE TOXIN PRODUCING E.COLI: NOT DETECTED
SHIGELLA SPP. / ENTEROINVASIVE E.COLI: NOT DETECTED
VIBRIO PARAHAEMOLYTICUS: NOT DETECTED
VIBRIO SPP.: NOT DETECTED
YERSINIA ENTEROCOLITICA: NOT DETECTED

## 2025-04-22 ENCOUNTER — OFFICE VISIT (OUTPATIENT)
Dept: URBAN - METROPOLITAN AREA CLINIC 19 | Facility: CLINIC | Age: 80
End: 2025-04-22
Payer: COMMERCIAL

## 2025-04-22 DIAGNOSIS — Q45.3 PANCREATIC ABNORMALITY: ICD-10-CM

## 2025-04-22 DIAGNOSIS — R10.84 ABDOMINAL CRAMPING, GENERALIZED: ICD-10-CM

## 2025-04-22 DIAGNOSIS — R19.7 DIARRHEA: ICD-10-CM

## 2025-04-22 PROCEDURE — 99214 OFFICE O/P EST MOD 30 MIN: CPT

## 2025-04-22 RX ORDER — LOVASTATIN 20 MG/1
1 TABLET WITH THE EVENING MEAL TABLET ORAL ONCE A DAY
Status: ACTIVE | COMMUNITY

## 2025-04-22 RX ORDER — TRAZODONE HYDROCHLORIDE 50 MG/1
1 TABLET AT BEDTIME AS NEEDED TABLET ORAL ONCE A DAY
Status: ACTIVE | COMMUNITY

## 2025-04-22 RX ORDER — CARVEDILOL 6.25 MG/1
1 TABLET WITH FOOD TABLET, FILM COATED ORAL TWICE A DAY
Status: ACTIVE | COMMUNITY

## 2025-04-22 RX ORDER — ZINC GLUCONATE 50 MG
1 TABLET TABLET ORAL ONCE A DAY
Status: ACTIVE | COMMUNITY

## 2025-04-22 RX ORDER — LOSARTAN POTASSIUM 50 MG/1
1 TABLET TABLET, FILM COATED ORAL ONCE A DAY
Status: ACTIVE | COMMUNITY

## 2025-04-22 RX ORDER — HYOSCYAMINE SULFATE 0.12 MG/1
1 TABLET UNDER THE TONGUE AND ALLOW TO DISSOLVE TABLET SUBLINGUAL THREE TIMES A DAY
Qty: 270 TABLET | Refills: 1 | Status: ACTIVE | COMMUNITY

## 2025-04-22 RX ORDER — PANTOPRAZOLE SODIUM 20 MG/1
1 TABLET 30 MINUTES BEFORE BREAKFAST TABLET, DELAYED RELEASE ORAL ONCE A DAY
Qty: 90 TABLET | Refills: 3 | Status: ACTIVE | COMMUNITY
Start: 2025-04-08

## 2025-04-22 NOTE — HPI-TODAY'S VISIT:
Mr. Landa returns today for imaging results.  Last seen on 4/8/2025 to schedule surveillance CT of the pancreas.  At that time he reported mid abdominal pain over the past 3 weeks with associated decreased appetite and diarrhea.  4/9/25 GPP was negative  4/16/25 CT A/P with contrast noting no abnormality on the pancreas, mild thickening of the anterior wall of the bladder that is asymmetrical and the possibility of neoplasm cannot be excluded.  Further evaluation with cystoscopy could be obtained.  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  Surgical absence of the gallbladder with mild dilatation of the CBD measuring 9 mm.  Severe calcification of the infrarenal abdominal aorta, common iliac and external iliac arteries.  There is significant atherosclerotic changes of the renal arteries as well.  He continues to have mid abdominal pain around his umbilicus that seems to worsen as the day progresses. The pain is keeping him from eating and now having weight loss.  Previous procedures: -2023 colonoscopy noted diverticulosis in the sigmoid and descending colon, 3 polyps removed from the sigmoid, descending and ascending colon. Path: Tubular adenomas and hyperplastic polyps. -2024 EGD noted nonsevere esophagitis with no bleeding, erythematous mucosa in the antrum, erosive gastropathy with no bleeding and no stigmata of recent bleeding, erythematous duodenopathy, mucosal changes in the duodenum. 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 Stomach biopsy negative for H. pylori or intestinal metaplasia. Duodenal biopsy with peptic duodenitis and ulcer, negative for sprue. -5/2024 EUS noted normal esophagus, stomach, nonbleeding duodenal ulcer with no stigmata of bleeding. Wall thickening was seen in the ampulla. It appeared to primarily be within the luminal interface/superficial mucosa (layer 1) and deep mucosa (layer 2). Pancreatic parenchymal abnormalities consisting of hyperechoic strands and lobularity were noted in the pancreatic head. There is no sign of significant pathology in the main pancreatic duct. Hyperechoic material consistent with sludge/cholesterolosis or adenomyomatosis was visualized in the gallbladder body. Ampulla biopsy negative for dysplasia or malignancy. Duodenal biopsy consistent with acute and chronic inflammation and severe reactive changes. Negative for dysplasia or malignancy. -10/2024 CT abdomen pancreatic mass w/wo contrast noted no pancreatic ductal dilatation or mass identified. No acute pancreatic inflammation.

## 2025-04-22 NOTE — PHYSICAL EXAM GASTROINTESTINAL
soft, mild tenderness around umbilicus, nondistended,  no guarding or rigidity,  no masses palpable,  normal bowel sounds,  no hepatosplenomegaly,  no rebound tenderness

## 2025-05-27 ENCOUNTER — OFFICE VISIT (OUTPATIENT)
Dept: URBAN - METROPOLITAN AREA CLINIC 19 | Facility: CLINIC | Age: 80
End: 2025-05-27
Payer: COMMERCIAL

## 2025-05-27 DIAGNOSIS — R19.7 DIARRHEA: ICD-10-CM

## 2025-05-27 DIAGNOSIS — R10.84 ABDOMINAL CRAMPING, GENERALIZED: ICD-10-CM

## 2025-05-27 DIAGNOSIS — Q45.3 PANCREATIC ABNORMALITY: ICD-10-CM

## 2025-05-27 PROCEDURE — 99214 OFFICE O/P EST MOD 30 MIN: CPT | Performed by: STUDENT IN AN ORGANIZED HEALTH CARE EDUCATION/TRAINING PROGRAM

## 2025-05-27 RX ORDER — PANCRELIPASE 36000; 180000; 114000 [USP'U]/1; [USP'U]/1; [USP'U]/1
2 CAPSULES WITH MEALS AND 1 TO 2 CAPSULE WITH SNACKS UP TO 10 CAPSULES PER DAY CAPSULE, DELAYED RELEASE PELLETS ORAL DAILY
Qty: 900 | Refills: 5 | OUTPATIENT
Start: 2025-05-27

## 2025-05-27 RX ORDER — LOSARTAN POTASSIUM 50 MG/1
1 TABLET TABLET, FILM COATED ORAL ONCE A DAY
Status: ACTIVE | COMMUNITY

## 2025-05-27 RX ORDER — LOVASTATIN 20 MG/1
1 TABLET WITH THE EVENING MEAL TABLET ORAL ONCE A DAY
Status: ACTIVE | COMMUNITY

## 2025-05-27 RX ORDER — CARVEDILOL 6.25 MG/1
1 TABLET WITH FOOD TABLET, FILM COATED ORAL TWICE A DAY
Status: ACTIVE | COMMUNITY

## 2025-05-27 RX ORDER — TRAZODONE HYDROCHLORIDE 50 MG/1
1 TABLET AT BEDTIME AS NEEDED TABLET ORAL ONCE A DAY
Status: ACTIVE | COMMUNITY

## 2025-05-27 RX ORDER — PANTOPRAZOLE SODIUM 20 MG/1
1 TABLET 30 MINUTES BEFORE BREAKFAST TABLET, DELAYED RELEASE ORAL ONCE A DAY
Qty: 90 TABLET | Refills: 3 | Status: ACTIVE | COMMUNITY
Start: 2025-04-08

## 2025-05-27 RX ORDER — ZINC GLUCONATE 50 MG
1 TABLET TABLET ORAL ONCE A DAY
Status: ACTIVE | COMMUNITY

## 2025-05-27 RX ORDER — HYOSCYAMINE SULFATE 0.12 MG/1
1 TABLET UNDER THE TONGUE AND ALLOW TO DISSOLVE TABLET SUBLINGUAL THREE TIMES A DAY
Qty: 270 TABLET | Refills: 1 | Status: ACTIVE | COMMUNITY

## 2025-05-27 NOTE — HPI-TODAY'S VISIT:
Luis E Landa is an 80-year-old male who reports ongoing weight loss and poor appetite. He mentions that he eats well when dining with family once a week but generally lacks appetite. Despite efforts to stimulate appetite, he continues to lose weight, having dropped from 224 lbs in December 2023 to 190 lbs currently. He denies feeling hungry and notes a decrease in taste and thirst drive. Luis E has undergone extensive workup, including tests for pancreatic insufficiency, which returned normal results. He has seen a vascular specialist, who plans to run further tests, including an ultrasound, due to concerns about aortic calcification affecting organ blood flow. Luis E's kin expresses concern about his sedentary lifestyle and suggests physical therapy to improve mobility. Luis E's BMI is currently stable, but there is concern about muscle wasting and the risk of falls due to continued weight loss. The provider discusses potential medication options to stimulate appetite and improve weight gain, including Creon, Megace, and Remeron, emphasizing the importance of exploring other options before committing to long-term enzyme replacement therapy.

## 2025-05-27 NOTE — HPI-OTHER HISTORIES
4/22/2025: SAJI Watkins: Mr. Landa returns today for imaging results. Last seen on 4/8/2025 to schedule surveillance CT of the pancreas. At that time he reported mid abdominal pain over the past 3 weeks with associated decreased appetite and diarrhea.  4/9/25 GPP was negative  4/16/25 CT A/P with contrast noting no abnormality on the pancreas, mild thickening of the anterior wall of the bladder that is asymmetrical and the possibility of neoplasm cannot be excluded. Further evaluation with cystoscopy could be obtained. 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 Surgical absence of the gallbladder with mild dilatation of the CBD measuring 9 mm. Severe calcification of the infrarenal abdominal aorta, common iliac and external iliac arteries. There is significant atherosclerotic changes of the renal arteries as well.  He continues to have mid abdominal pain around his umbilicus that seems to worsen as the day progresses. The pain is keeping him from eating and now having weight loss.  Previous procedures: -2023 colonoscopy noted diverticulosis in the sigmoid and descending colon, 3 polyps removed from the sigmoid, descending and ascending colon. Path: Tubular adenomas and hyperplastic polyps. -2024 EGD noted nonsevere esophagitis with no bleeding, erythematous mucosa in the antrum, erosive gastropathy with no bleeding and no stigmata of recent bleeding, erythematous duodenopathy, mucosal changes in the duodenum. 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 Stomach biopsy negative for H. pylori or intestinal metaplasia. Duodenal biopsy with peptic duodenitis and ulcer, negative for sprue. -5/2024 EUS noted normal esophagus, stomach, nonbleeding duodenal ulcer with no stigmata of bleeding. Wall thickening was seen in the ampulla. It appeared to primarily be within the luminal interface/superficial mucosa (layer 1) and deep mucosa (layer 2). Pancreatic parenchymal abnormalities consisting of hyperechoic strands and lobularity were noted in the pancreatic head. There is no sign of significant pathology in the main pancreatic duct. Hyperechoic material consistent with sludge/cholesterolosis or adenomyomatosis was visualized in the gallbladder body. Ampulla biopsy negative for dysplasia or malignancy. Duodenal biopsy consistent with acute and chronic inflammation and severe reactive changes. Negative for dysplasia or malignancy. -10/2024 CT abdomen pancreatic mass w/wo contrast noted no pancreatic ductal dilatation or mass identified. No acute pancreatic inflammation. Plan:  Urology and vascular surgery evaluation.

## 2025-08-07 ENCOUNTER — ERX REFILL RESPONSE (OUTPATIENT)
Dept: URBAN - METROPOLITAN AREA CLINIC 19 | Facility: CLINIC | Age: 80
End: 2025-08-07

## 2025-08-07 RX ORDER — FAMOTIDINE 40 MG/1
1 TABLET TABLET, FILM COATED ORAL ONCE A DAY
Qty: 90 | Refills: 5 | OUTPATIENT

## 2025-08-07 RX ORDER — PANTOPRAZOLE SODIUM 20 MG/1
1 TABLET 30 MINUTES BEFORE BREAKFAST TABLET, DELAYED RELEASE ORAL ONCE A DAY
Qty: 90 TABLET | Refills: 3 | OUTPATIENT